# Patient Record
Sex: FEMALE | Race: WHITE
[De-identification: names, ages, dates, MRNs, and addresses within clinical notes are randomized per-mention and may not be internally consistent; named-entity substitution may affect disease eponyms.]

---

## 2017-06-01 ENCOUNTER — HOSPITAL ENCOUNTER (EMERGENCY)
Dept: HOSPITAL 76 - ED | Age: 42
LOS: 1 days | Discharge: HOME | End: 2017-06-02
Payer: MEDICAID

## 2017-06-01 VITALS — DIASTOLIC BLOOD PRESSURE: 86 MMHG | SYSTOLIC BLOOD PRESSURE: 135 MMHG

## 2017-06-01 DIAGNOSIS — J45.909: ICD-10-CM

## 2017-06-01 DIAGNOSIS — G80.9: ICD-10-CM

## 2017-06-01 DIAGNOSIS — F43.10: ICD-10-CM

## 2017-06-01 DIAGNOSIS — F32.9: Primary | ICD-10-CM

## 2017-06-01 DIAGNOSIS — R45.851: ICD-10-CM

## 2017-06-01 DIAGNOSIS — F41.9: ICD-10-CM

## 2017-06-01 LAB
ALBUMIN/GLOB SERPL: 1.5 {RATIO} (ref 1–2.2)
ANION GAP SERPL CALCULATED.4IONS-SCNC: 11 MMOL/L (ref 6–13)
APAP SERPL-MCNC: < 10 UG/ML (ref 10–30)
BASOPHILS NFR BLD AUTO: 0.1 10^3/UL (ref 0–0.1)
BASOPHILS NFR BLD AUTO: 0.8 %
BILIRUB BLD-MCNC: < 0.2 MG/DL (ref 0.2–1)
BUN SERPL-MCNC: 12 MG/DL (ref 6–20)
CALCIUM UR-MCNC: 9.7 MG/DL (ref 8.5–10.3)
CHLORIDE SERPL-SCNC: 105 MMOL/L (ref 101–111)
CO2 SERPL-SCNC: 22 MMOL/L (ref 21–32)
CREAT SERPLBLD-SCNC: 0.6 MG/DL (ref 0.4–1)
CUL URINE ADD CHARGE: (no result)
EOSINOPHIL # BLD AUTO: 0 10^3/UL (ref 0–0.7)
EOSINOPHIL NFR BLD AUTO: 0.5 %
ERYTHROCYTE [DISTWIDTH] IN BLOOD BY AUTOMATED COUNT: 12.4 % (ref 12–15)
GFRSERPLBLD MDRD-ARVRAT: 111 ML/MIN/{1.73_M2} (ref 89–?)
GLOBULIN SER-MCNC: 3.1 G/DL (ref 2.1–4.2)
GLUCOSE SERPL-MCNC: 99 MG/DL (ref 70–100)
HCT VFR BLD AUTO: 40.1 % (ref 37–47)
HGB UR QL STRIP: 13.7 G/DL (ref 12–16)
LIPASE SERPL-CCNC: 19 U/L (ref 22–51)
LYMPHOCYTES # SPEC AUTO: 2.5 10^3/UL (ref 1.5–3.5)
LYMPHOCYTES NFR BLD AUTO: 30.5 %
MCH RBC QN AUTO: 32.5 PG (ref 27–31)
MCHC RBC AUTO-ENTMCNC: 34.2 G/DL (ref 32–36)
MCV RBC AUTO: 94.9 FL (ref 81–99)
MONOCYTES # BLD AUTO: 0.8 10^3/UL (ref 0–1)
MONOCYTES NFR BLD AUTO: 10.3 %
NEUTROPHILS # BLD AUTO: 4.7 10^3/UL (ref 1.5–6.6)
NEUTROPHILS # SNV AUTO: 8.2 X10^3/UL (ref 4.8–10.8)
NEUTROPHILS NFR BLD AUTO: 57.9 %
NRBC # BLD AUTO: 0 /100WBC
PDW BLD AUTO: 9.2 FL (ref 7.9–10.8)
PH UR STRIP.AUTO: 6 PH (ref 5–7.5)
POTASSIUM SERPL-SCNC: 3.8 MMOL/L (ref 3.5–5)
PROT SPEC-MCNC: 7.8 G/DL (ref 6.7–8.2)
RBC MAR: 4.22 10^6/UL (ref 4.2–5.4)
SALICYLATES SERPL-MCNC: < 6 MG/DL
SODIUM SERPLBLD-SCNC: 138 MMOL/L (ref 135–145)
SP GR UR STRIP.AUTO: 1.01 (ref 1–1.03)
UA CHARGE (STRIP ONLY): (no result)
UA W/ MICROSCOPIC CHARGE: YES
UR CULTURE IF IND: (no result)
UROBILINOGEN UR STRIP.AUTO-MCNC: NEGATIVE MG/DL
WBC # BLD: 8.2 X10^3/UL
WBC # UR MANUAL: (no result) /HPF (ref 0–5)

## 2017-06-01 PROCEDURE — 80306 DRUG TEST PRSMV INSTRMNT: CPT

## 2017-06-01 PROCEDURE — 84703 CHORIONIC GONADOTROPIN ASSAY: CPT

## 2017-06-01 PROCEDURE — 81001 URINALYSIS AUTO W/SCOPE: CPT

## 2017-06-01 PROCEDURE — 87086 URINE CULTURE/COLONY COUNT: CPT

## 2017-06-01 PROCEDURE — 80320 DRUG SCREEN QUANTALCOHOLS: CPT

## 2017-06-01 PROCEDURE — 80307 DRUG TEST PRSMV CHEM ANLYZR: CPT

## 2017-06-01 PROCEDURE — 85025 COMPLETE CBC W/AUTO DIFF WBC: CPT

## 2017-06-01 PROCEDURE — 83690 ASSAY OF LIPASE: CPT

## 2017-06-01 PROCEDURE — 81025 URINE PREGNANCY TEST: CPT

## 2017-06-01 PROCEDURE — 99283 EMERGENCY DEPT VISIT LOW MDM: CPT

## 2017-06-01 PROCEDURE — 81003 URINALYSIS AUTO W/O SCOPE: CPT

## 2017-06-01 PROCEDURE — 80329 ANALGESICS NON-OPIOID 1 OR 2: CPT

## 2017-06-01 PROCEDURE — 80053 COMPREHEN METABOLIC PANEL: CPT

## 2017-06-01 PROCEDURE — 84443 ASSAY THYROID STIM HORMONE: CPT

## 2017-06-01 PROCEDURE — 36415 COLL VENOUS BLD VENIPUNCTURE: CPT

## 2017-06-01 NOTE — ED PHYSICIAN DOCUMENTATION
PD HPI MHE





- Stated complaint


Stated Complaint: MHE





- Chief complaint


Chief Complaint: MHE





- History obtained from


History obtained from: Patient





- History of Present Illness


Primary symptom: Suicidal ideation, Depression, Anxiety


Timing - onset: Today


Contributing factors: Sig other, Off meds


Similar symptoms before: Work up / diagnostics, Treatment


Recently seen: Not recently seen





- Additional information


Additional information: 





Patient is a 41 year old female with a history of anxiety and depression who is 

presenting to the emergency department for an anxiety attack and suicidal 

ideation.  Patient states that she has not been taking her medications (taking 

half doses).  Patient states that she will get these periods where she will get 

really upset and worried.  during the spells she feels out of control and has 

thought about suicide.  Upon initial evaluation in the emergency department 

patient stated that she was feeling a bit better and thought that maybe it had 

to do with the fact that her  was out of town.  





Review of Systems


Constitutional: denies: Fever, Chills


Eyes: denies: Photophobia


Ears: denies: Ear pain


Nose: denies: Rhinorrhea / runny nose, Congestion


Throat: denies: Oral lesions / sores, Sore throat


Cardiac: denies: Chest pain / pressure, Palpitations


Respiratory: denies: Cough


GI: denies: Abdominal Pain, Nausea, Vomiting


: denies: Dysuria, Frequency, Hesitancy


Skin: denies: Rash, Lesions


Musculoskeletal: denies: Neck pain, Back pain, Extremity pain, Joint pain


Psychiatric: reports: Depressed, Suicidal, Anxiety, Insomnia.  denies: Homicidal

, Hallucinations, Delusions


Immunocompromised: denies: Immunocompromised





PD PAST MEDICAL HISTORY





- Past Medical History


Respiratory: Asthma


Neuro: Cerebral palsy


Psych: Anxiety, Post traumatic stress disorder





- Past Surgical History


Past Surgical History: Yes


HEENT: Myringotomy (tubes)





- Present Medications


Home Medications: 


 Ambulatory Orders











 Medication  Instructions  Recorded  Confirmed


 


buPROPion [Wellbutrin Xl] 200 mg PO DAILY 11/21/15 06/01/17


 


LORazepam [Ativan] 0.25 mg PO Q6H #5 tablet 06/01/17 














- Allergies


Allergies/Adverse Reactions: 


 Allergies











Allergy/AdvReac Type Severity Reaction Status Date / Time


 


morphine Allergy  Rash Verified 04/15/16 15:06


 


povidone-iodine Allergy  Rash Verified 04/15/16 15:06





[From Betadine]     


 


soap [From Betadine] Allergy  Rash Verified 04/15/16 15:06














- Social History


Does the pt smoke?: No


Smoking Status: Never smoker


Does the pt drink ETOH?: No


Does the pt have substance abuse?: No





- Immunizations


Immunizations are current?: Yes





- POLST


Patient has POLST: No





PD ED PE NORMAL





- Vitals


Vital signs reviewed: Yes





- General


General: Alert and oriented X 3, Well developed/nourished





- HEENT


HEENT: Atraumatic, PERRL





- Cardiac


Cardiac: RRR, No murmur





- Respiratory


Respiratory: No respiratory distress, Clear bilaterally





- Abdomen


Abdomen: Soft, Non tender, Non distended





- Derm


Derm: Normal color, Warm and dry, No rash





- Extremities


Extremities: No deformity, No tenderness to palpate, No edema





- Neuro


Neuro: No motor deficit, No sensory deficit, Normal speech





PD ED PE EXPANDED





- Psych


Psych: Depressed, Tearful, Poor eye contact, Anxious





Results





- Vitals


Vitals: 





 Vital Signs - 24 hr











  06/01/17 06/01/17





  20:43 21:44


 


Temperature 36.8 C 


 


Heart Rate 124 H 94


 


Respiratory 16 18





Rate  


 


Blood Pressure 161/91 H 139/61 H


 


O2 Saturation 100 99








 Oxygen











O2 Source                      Room air

















- Labs


Labs: 





 Laboratory Tests











  06/01/17 06/01/17 06/01/17





  21:06 21:06 21:08


 


WBC    8.2


 


RBC    4.22


 


Hgb    13.7


 


Hct    40.1


 


MCV    94.9


 


MCH    32.5 H


 


MCHC    34.2


 


RDW    12.4


 


Plt Count    255


 


MPV    9.2


 


Neut #    4.7


 


Lymph #    2.5


 


Mono #    0.8


 


Eos #    0.0


 


Baso #    0.1


 


Absolute Nucleated RBC    0.00


 


Nucleated RBCs    0.0


 


Sodium   


 


Potassium   


 


Chloride   


 


Carbon Dioxide   


 


Anion Gap   


 


BUN   


 


Creatinine   


 


Estimated GFR (MDRD)   


 


Glucose   


 


Calcium   


 


Total Bilirubin   


 


AST   


 


ALT   


 


Alkaline Phosphatase   


 


Total Protein   


 


Albumin   


 


Globulin   


 


Albumin/Globulin Ratio   


 


Lipase   


 


TSH   


 


Serum HCG, Qual   


 


Urine Color   YELLOW 


 


Urine Clarity   CLEAR 


 


Urine pH   6.0 


 


Ur Specific Gravity   1.010 


 


Urine Protein   NEGATIVE 


 


Urine Glucose (UA)   NEGATIVE 


 


Urine Ketones   NEGATIVE 


 


Urine Occult Blood   NEGATIVE 


 


Urine Nitrite   NEGATIVE 


 


Urine Bilirubin   NEGATIVE 


 


Urine Urobilinogen   0.2 (NORMAL) 


 


Ur Leukocyte Esterase   SMALL H 


 


Urine RBC   0-5 


 


Urine WBC   4-5 


 


Ur Squamous Epith Cells   MANY Squamous H 


 


Urine Bacteria   Moderate H 


 


Ur Microscopic Review   INDICATED 


 


Urine Culture Comments   NOT INDICATED 


 


Urine HCG, Qual   Cancelled 


 


Salicylates   


 


Urine Opiates Screen  NEGATIVE  


 


Ur Oxycodone Screen  NEGATIVE  


 


Urine Methadone Screen  NEGATIVE  


 


Ur Propoxyphene Screen  NEGATIVE  


 


Acetaminophen   


 


Ur Barbiturates Screen  NEGATIVE  


 


Ur Tricyclics Screen  NEGATIVE  


 


Ur Phencyclidine Scrn  NEGATIVE  


 


Ur Amphetamine Screen  NEGATIVE  


 


U Methamphetamines Scrn  NEGATIVE  


 


U Benzodiazepines Scrn  NEGATIVE  


 


Urine Cocaine Screen  NEGATIVE  


 


U Cannabinoids Screen  NEGATIVE  


 


Ethyl Alcohol   














  06/01/17 06/01/17 06/01/17





  21:08 21:08 21:08


 


WBC   


 


RBC   


 


Hgb   


 


Hct   


 


MCV   


 


MCH   


 


MCHC   


 


RDW   


 


Plt Count   


 


MPV   


 


Neut #   


 


Lymph #   


 


Mono #   


 


Eos #   


 


Baso #   


 


Absolute Nucleated RBC   


 


Nucleated RBCs   


 


Sodium  138  


 


Potassium  3.8  


 


Chloride  105  


 


Carbon Dioxide  22  


 


Anion Gap  11.0  


 


BUN  12  


 


Creatinine  0.6  


 


Estimated GFR (MDRD)  111  


 


Glucose  99  


 


Calcium  9.7  


 


Total Bilirubin  < 0.2 L  


 


AST  15  


 


ALT  15  


 


Alkaline Phosphatase  36 L  


 


Total Protein  7.8  


 


Albumin  4.7  


 


Globulin  3.1  


 


Albumin/Globulin Ratio  1.5  


 


Lipase  19 L  


 


TSH   1.98 


 


Serum HCG, Qual    NEGATIVE


 


Urine Color   


 


Urine Clarity   


 


Urine pH   


 


Ur Specific Gravity   


 


Urine Protein   


 


Urine Glucose (UA)   


 


Urine Ketones   


 


Urine Occult Blood   


 


Urine Nitrite   


 


Urine Bilirubin   


 


Urine Urobilinogen   


 


Ur Leukocyte Esterase   


 


Urine RBC   


 


Urine WBC   


 


Ur Squamous Epith Cells   


 


Urine Bacteria   


 


Ur Microscopic Review   


 


Urine Culture Comments   


 


Urine HCG, Qual   


 


Salicylates  < 6.0  


 


Urine Opiates Screen   


 


Ur Oxycodone Screen   


 


Urine Methadone Screen   


 


Ur Propoxyphene Screen   


 


Acetaminophen  < 10 L  


 


Ur Barbiturates Screen   


 


Ur Tricyclics Screen   


 


Ur Phencyclidine Scrn   


 


Ur Amphetamine Screen   


 


U Methamphetamines Scrn   


 


U Benzodiazepines Scrn   


 


Urine Cocaine Screen   


 


U Cannabinoids Screen   


 


Ethyl Alcohol  < 5.0  














PD MEDICAL DECISION MAKING





- ED course


Complexity details: reviewed old records, reviewed results, re-evaluated patient

, considered differential, d/w patient, d/w consultant


ED course: 





Patient was seen and examined at bedside.  labs were drawn and urine was 

collected.  Patient stated that she did not need anything for anxiety at that 

time.  when patient's labs came back patient was medically clear.  [facility] was 

contacted and was able to talk with the patient.  patient was able to contract 

for safety and an urgent appointment was made for tomorrow.  patient had a good 

network at home and felt comfortable with the plan.  patient required no 

further work up and was stable for discharge with outpatient follow up.  





Departure





- Departure


Disposition: 01 Home, Self Care


Clinical Impression: 


 Anxiety, Depression


Condition: Good


Instructions:  ED Stress React


Follow-Up: 


Katty Velazquez ARNP [Primary Care Provider] - Tomorrow


Prescriptions: 


LORazepam [Ativan] 0.25 mg PO Q6H #5 tablet


Comments: 


It is important that you call the number given to you for the emergent 

appointment for tomorrow.  You should also call your pcp and therapist to 

schedule a follow up appointment for medication dosage.  You should return to 

the emergency department or call the crisis line for any thoughts of hurting 

yourself, any thoughts of hurting anyone else.  Or any hallucinations.

## 2017-07-20 ENCOUNTER — HOSPITAL ENCOUNTER (EMERGENCY)
Dept: HOSPITAL 76 - ED | Age: 42
Discharge: HOME | End: 2017-07-20
Payer: COMMERCIAL

## 2017-07-20 VITALS — DIASTOLIC BLOOD PRESSURE: 86 MMHG | SYSTOLIC BLOOD PRESSURE: 137 MMHG
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
## 2017-07-20 DIAGNOSIS — G80.9: ICD-10-CM

## 2017-07-20 DIAGNOSIS — S09.90XA: ICD-10-CM
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
## 2017-07-20 DIAGNOSIS — J45.909: ICD-10-CM

## 2017-07-20 DIAGNOSIS — S39.012A: Primary | ICD-10-CM

## 2017-07-20 PROCEDURE — 99283 EMERGENCY DEPT VISIT LOW MDM: CPT

## 2017-07-20 PROCEDURE — 70450 CT HEAD/BRAIN W/O DYE: CPT
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
## 2017-07-20 NOTE — CT REPORT
EXAM:

CT HEAD

 

EXAM DATE: 7/20/2017 01:47 PM.

 

CLINICAL HISTORY: Cerebral palsy. Persistent headaches following a head injury.

 

COMPARISON: None.

 

TECHNIQUE: Multiaxial CT images were obtained from the foramen magnum to the vertex. IV contrast: Non
e. Reformats: Coronal.

 

In accordance with CT protocol optimization, one or more of the following dose reduction techniques w
ere utilized for this exam: automated exposure control, adjustment of mA and/or KV based on patient s
ize, or use of iterative reconstructive technique.

 

FINDINGS:

Parenchyma: No intraparenchymal hemorrhage. No evidence of mass, midline shift, or CT findings of inf
arction. Gray-white differentiation is distinct.

 

Extraaxial Spaces: Normal for age. No subdural or epidural collections identified.

 

Ventricles: Normal in size and position.

 

Sinuses: Imaged paranasal sinuses, orbits, and mastoids show no significant abnormality.

 

Bones: No evidence of fracture or calvarial defect.

 

Other: None.

 

IMPRESSION: Normal head CT.

 

[facility] 

Referring Provider Line: [phone]

 

SITE ID: [id number]

## 2017-07-20 NOTE — CT PRELIMINARY REPORT
Accession: [id number]

Exam: CT Head W/O

 

IMPRESSION: Normal head CT.

 

[facility] 

 

SITE ID: [id number]

## 2017-08-06 ENCOUNTER — HOSPITAL ENCOUNTER (EMERGENCY)
Dept: HOSPITAL 76 - ED | Age: 42
Discharge: HOME | End: 2017-08-06
Payer: COMMERCIAL

## 2017-08-06 VITALS — DIASTOLIC BLOOD PRESSURE: 81 MMHG | SYSTOLIC BLOOD PRESSURE: 138 MMHG

## 2017-08-06 DIAGNOSIS — R00.0: ICD-10-CM

## 2017-08-06 DIAGNOSIS — T43.291A: Primary | ICD-10-CM

## 2017-08-06 DIAGNOSIS — R03.0: ICD-10-CM

## 2017-08-06 LAB
ANION GAP SERPL CALCULATED.4IONS-SCNC: 8 MMOL/L (ref 6–13)
BASE EXCESS BLDV CALC-SCNC: 2 MMOL/L
BUN SERPL-MCNC: 7 MG/DL (ref 6–20)
CALCIUM UR-MCNC: 9.2 MG/DL (ref 8.5–10.3)
CHLORIDE SERPL-SCNC: 104 MMOL/L (ref 101–111)
CO2 BLDV-SCNC: 28.3 MMOL/L (ref 24–29)
CO2 SERPL-SCNC: 25 MMOL/L (ref 21–32)
CREAT SERPLBLD-SCNC: 0.7 MG/DL (ref 0.4–1)
GFRSERPLBLD MDRD-ARVRAT: 93 ML/MIN/{1.73_M2} (ref 89–?)
GLUCOSE SERPL-MCNC: 91 MG/DL (ref 70–100)
PH BLDV: 7.41 [PH] (ref 7.31–7.41)
POTASSIUM SERPL-SCNC: 3.7 MMOL/L (ref 3.5–5)
SAO2 DF BLDV: 91.1 % (ref 60–80)
SODIUM SERPLBLD-SCNC: 137 MMOL/L (ref 135–145)

## 2017-08-06 PROCEDURE — 99283 EMERGENCY DEPT VISIT LOW MDM: CPT

## 2017-08-06 PROCEDURE — 96361 HYDRATE IV INFUSION ADD-ON: CPT

## 2017-08-06 PROCEDURE — 96374 THER/PROPH/DIAG INJ IV PUSH: CPT

## 2017-08-06 PROCEDURE — 93005 ELECTROCARDIOGRAM TRACING: CPT

## 2017-08-06 PROCEDURE — 36415 COLL VENOUS BLD VENIPUNCTURE: CPT

## 2017-08-06 PROCEDURE — 80048 BASIC METABOLIC PNL TOTAL CA: CPT

## 2017-08-06 PROCEDURE — 82803 BLOOD GASES ANY COMBINATION: CPT

## 2017-08-06 PROCEDURE — 99284 EMERGENCY DEPT VISIT MOD MDM: CPT

## 2017-08-06 NOTE — ED PHYSICIAN DOCUMENTATION
History of Present Illness





- Stated complaint


Stated Complaint: MOUTH NUMBNESS/ARM TINGGLE





- Chief complaint


Chief Complaint: General





- History obtained from


History obtained from: Patient





- History of Present Illness


Timing: Other (She is undergoing some medication changes and inadvertently took 

an extra dose of Wellbutrin, total of 400 instead of 200 mg this morning at 9 

AM.  Around noon today she started to feel very anxious, her lips and arms were 

tingling and felt heavy.  It is painless.)





Review of Systems


Ten Systems: 10 systems reviewed and negative


Constitutional: denies: Fever, Chills


Cardiac: denies: Chest pain / pressure, Palpitations


Respiratory: denies: Dyspnea, Cough


GI: denies: Abdominal Pain





PD PAST MEDICAL HISTORY





- Past Medical History


Past Medical History: Yes


Respiratory: Asthma


Neuro: Cerebral palsy


Psych: Anxiety, Post traumatic stress disorder


Other Past Medical History: CP





- Past Surgical History


Past Surgical History: Yes


HEENT: Myringotomy (tubes)





- Present Medications


Home Medications: 


 Ambulatory Orders











 Medication  Instructions  Recorded  Confirmed


 


buPROPion [Wellbutrin Xl] 200 mg PO DAILY 11/21/15 08/06/17


 


LORazepam [Ativan] 0.25 mg PO Q6H #5 tablet 06/01/17 08/06/17


 


Cyclobenzaprine [Flexeril] 10 mg PO TID PRN #20 tablet 07/20/17 08/06/17


 


Lamotrigine [Lamotrigine ER] 50 mg PO BID 07/20/17 08/06/17


 


Lidocaine Patch 5% [Lidoderm Patch] 1 each TOP DAILY PRN #10 patch 07/20/17 08/ 06/17














- Allergies


Allergies/Adverse Reactions: 


 Allergies











Allergy/AdvReac Type Severity Reaction Status Date / Time


 


morphine Allergy  Rash Verified 04/15/16 15:06


 


povidone-iodine Allergy  Rash Verified 04/15/16 15:06





[From Betadine]     


 


soap [From Betadine] Allergy  Rash Verified 04/15/16 15:06














- Social History


Does the pt smoke?: No


Smoking Status: Never smoker


Does the pt drink ETOH?: No


Does the pt have substance abuse?: No





- Immunizations


Immunizations are current?: Yes





- POLST


Patient has POLST: No





PD ED PE NORMAL





- Vitals


Vital signs reviewed: Yes





- General


General: Alert and oriented X 3, Other (Anxious, speaking quickly)





- HEENT


HEENT: PERRL, EOMI, Other (Midpoint reactive pupils)





- Neck


Neck: Supple, no meningeal sign, No bony TTP





- Cardiac


Cardiac: Other (Tachycardic, regular, no murmur)





- Respiratory


Respiratory: No respiratory distress, Clear bilaterally





- Abdomen


Abdomen: Non tender





- Extremities


Extremities: No edema, No calf tenderness / cord





- Neuro


Neuro: Alert and oriented X 3, Normal speech





Results





- Vitals


Vitals: 


 Vital Signs - 24 hr











  08/06/17 08/06/17





  12:49 13:22


 


Temperature 36.4 C L 


 


Heart Rate 127 H 93


 


Respiratory 22 18





Rate  


 


Blood Pressure 161/111 H 141/94 H


 


O2 Saturation 100 100








 Oxygen











O2 Source                      Room air

















- EKG (time done)


  ** 1306


Rate: Rate (enter#) (112)


Rhythm: Sinus tachycardia


Axis: Normal


Intervals: Normal OH


QRS: Normal


Ischemia: Normal ST segments


Computer interpretation: Agree with computer





- Labs


Labs: 


 Laboratory Tests











  08/06/17 08/06/17





  13:15 13:15


 


VBG pH   7.414 H


 


VBG pCO2   43.1


 


VBG pO2   55.3 H


 


VBG HCO3   27.0


 


VBG Total CO2   28.3


 


VBG O2 Saturation   91.1 H


 


VBG Base Excess   2.0


 


Sodium  137 


 


Potassium  3.7 


 


Chloride  104 


 


Carbon Dioxide  25 


 


Anion Gap  8.0 


 


BUN  7 


 


Creatinine  0.7 


 


Estimated GFR (MDRD)  93 


 


Glucose  91 


 


Calcium  9.2 














PD MEDICAL DECISION MAKING





- ED course


ED course: 





She accidentally took a double dose of her usual medications.  She presents 

also with symptoms and signs of a panic attack.  Feeling better after IV fluids 

and Ativan.  Observe for a couple of hours without clinical change.





Departure





- Departure


Disposition: 01 Home, Self Care


Clinical Impression: 


Accidental overdose


Qualifiers:


 Encounter type: initial encounter Qualified Code(s): T50.901A - Poisoning by 

unspecified drugs, medicaments and biological substances, accidental (

unintentional), initial encounter


Condition: Good


Record reviewed to determine appropriate education?: Yes


Instructions:  ED Overdose Accidental


Comments: 


Your blood pressure was elevated today on check into the emergency department.  

This does not mean that you have hypertension, it is a common phenomenon to 

come to the emergency department and have elevated blood pressure.  I recommend 

that she see her primary care physician within the week to have it rechecked 

when you are feeling better.





Call your doctor to arrange a follow-up appointment, make the next available 

appointment.  In the interim, return anytime if worse or if new symptoms 

develop.

## 2017-10-12 ENCOUNTER — HOSPITAL ENCOUNTER (OUTPATIENT)
Dept: HOSPITAL 76 - LAB | Age: 42
Discharge: HOME | End: 2017-10-12
Attending: NURSE PRACTITIONER
Payer: MEDICAID

## 2017-10-12 DIAGNOSIS — Z13.1: Primary | ICD-10-CM

## 2017-10-12 LAB
EST. AVERAGE GLUCOSE BLD GHB EST-MCNC: 105 MG/DL (ref 70–100)
HBA1C BLD-MCNC: 0.52 G/DL

## 2017-10-12 PROCEDURE — 83036 HEMOGLOBIN GLYCOSYLATED A1C: CPT

## 2017-10-12 PROCEDURE — 36415 COLL VENOUS BLD VENIPUNCTURE: CPT

## 2017-10-21 ENCOUNTER — HOSPITAL ENCOUNTER (EMERGENCY)
Dept: HOSPITAL 76 - ED | Age: 42
Discharge: HOME | End: 2017-10-21
Payer: MEDICAID

## 2017-10-21 VITALS — SYSTOLIC BLOOD PRESSURE: 135 MMHG | DIASTOLIC BLOOD PRESSURE: 95 MMHG

## 2017-10-21 DIAGNOSIS — N76.0: ICD-10-CM

## 2017-10-21 DIAGNOSIS — G62.9: Primary | ICD-10-CM

## 2017-10-21 DIAGNOSIS — G80.9: ICD-10-CM

## 2017-10-21 LAB
CUL URINE ADD CHARGE: (no result)
PH UR STRIP.AUTO: 7 PH (ref 5–7.5)
SP GR UR STRIP.AUTO: 1.02 (ref 1–1.03)
UA CHARGE (STRIP ONLY): YES
UA W/ MICROSCOPIC CHARGE: (no result)
UR CULTURE IF IND: (no result)
UROBILINOGEN UR STRIP.AUTO-MCNC: NEGATIVE MG/DL

## 2017-10-21 PROCEDURE — 81001 URINALYSIS AUTO W/SCOPE: CPT

## 2017-10-21 PROCEDURE — 99283 EMERGENCY DEPT VISIT LOW MDM: CPT

## 2017-10-21 PROCEDURE — 81003 URINALYSIS AUTO W/O SCOPE: CPT

## 2017-10-21 PROCEDURE — 87086 URINE CULTURE/COLONY COUNT: CPT

## 2017-10-21 PROCEDURE — 81025 URINE PREGNANCY TEST: CPT

## 2017-10-21 NOTE — ED PHYSICIAN DOCUMENTATION
History of Present Illness





- Stated complaint


Stated Complaint: RT HAND WEAKNESS,ANXIETY





- Chief complaint


Chief Complaint: Ext Problem





- History obtained from


History obtained from: Patient





- Additonal information


Additional information: 





Patient is a 42 year old female with a history of cerebral palsy who is 

presenting to the emergency department for tingling in her right hand, and a 

foul vaginal odor.  Patient states that for a lot of the day it seems like her 

arm has gone to sleep. Patient states that she has had this before in the past.

  Patient denied any lack of strength or motor dysfunction.  Patient also 

states that she has a foul odor coming from her vagina.  Patient states that 

she washes it but the smell comes back.  Patient denies any vaginal bleeding or 

vaginal discharge.   





Review of Systems


Constitutional: denies: Fever, Chills


Eyes: denies: Loss of vision, Photophobia, Discharge, Irritation


Ears: denies: Loss of hearing, Ear pain


Nose: reports: Reviewed and negative


Throat: reports: Reviewed and negative


Cardiac: denies: Chest pain / pressure, Palpitations


Respiratory: denies: Cough, Wheezing


GI: denies: Abdominal Pain, Nausea, Vomiting


: reports: Other (irregular small).  denies: Dysuria, Frequency, Hesitancy, 

Discharge, Vaginal bleeding


Skin: denies: Rash, Lesions


Musculoskeletal: denies: Extremity pain


Neurologic: reports: Numbness


Immunocompromised: denies: Immunocompromised





PD PAST MEDICAL HISTORY





- Past Medical History


Past Medical History: Yes


Respiratory: Asthma


Neuro: Cerebral palsy


Psych: Anxiety, Post traumatic stress disorder





- Past Surgical History


Past Surgical History: Yes


HEENT: Myringotomy (tubes)





- Present Medications


Home Medications: 


 Ambulatory Orders











 Medication  Instructions  Recorded  Confirmed


 


buPROPion [Wellbutrin Xl] 200 mg PO DAILY 11/21/15 10/21/17


 


LORazepam [Ativan] 0.25 mg PO Q6H #5 tablet 06/01/17 10/21/17


 


Lamotrigine [Lamotrigine ER] 50 mg PO BID 07/20/17 10/21/17


 


Fluconazole [Diflucan] 150 mg PO ONCE #1 tablet 10/21/17 


 


Metronidazole [Flagyl] 500 mg PO BID 7 Days  tablet 10/21/17 


 


traZODone [Desyrel] 50 mg PO QPM 10/21/17 10/21/17














- Allergies


Allergies/Adverse Reactions: 


 Allergies











Allergy/AdvReac Type Severity Reaction Status Date / Time


 


morphine Allergy  Rash Verified 10/21/17 19:15


 


povidone-iodine Allergy  Rash Verified 10/21/17 19:15





[From Betadine]     


 


soap [From Betadine] Allergy  Rash Verified 10/21/17 19:15














- Social History


Does the pt smoke?: No


Smoking Status: Never smoker


Does the pt drink ETOH?: No


Does the pt have substance abuse?: No





- Immunizations


Immunizations are current?: Yes





- POLST


Patient has POLST: No





PD ED PE NORMAL





- Vitals


Vital signs reviewed: Yes





- General


General: Alert and oriented X 3, No acute distress, Well developed/nourished





- HEENT


HEENT: Atraumatic, PERRL





- Neck


Neck: Supple, no meningeal sign





- Cardiac


Cardiac: RRR, No murmur





- Respiratory


Respiratory: No respiratory distress, Clear bilaterally





- Abdomen


Abdomen: Soft, Non tender, Non distended





- Female 


Female : Pt declined





- Derm


Derm: Normal color, Warm and dry, No rash





- Extremities


Extremities: No deformity, No tenderness to palpate, Normal ROM s pain, No edema

, No calf tenderness / cord





- Neuro


Neuro: Alert and oriented X 3, No motor deficit, No sensory deficit, Normal 

speech





PD ED PE EXPANDED





- General


General: Alert, Anxious





Results





- Vitals


Vitals: 


 Vital Signs - 24 hr











  10/21/17 10/21/17





  19:11 21:00


 


Temperature 36.7 C 37.1 C


 


Heart Rate 106 H 99


 


Respiratory 20 20





Rate  


 


Blood Pressure 151/94 H 135/95 H


 


O2 Saturation 100 








 Oxygen











O2 Source                      Room air

















- Labs


Labs: 


 Laboratory Tests











  10/21/17





  19:39


 


Urine Color  YELLOW


 


Urine Clarity  CLEAR


 


Urine pH  7.0


 


Ur Specific Gravity  1.020


 


Urine Protein  NEGATIVE


 


Urine Glucose (UA)  NEGATIVE


 


Urine Ketones  NEGATIVE


 


Urine Occult Blood  NEGATIVE


 


Urine Nitrite  NEGATIVE


 


Urine Bilirubin  NEGATIVE


 


Urine Urobilinogen  0.2 (NORMAL)


 


Ur Leukocyte Esterase  NEGATIVE


 


Ur Microscopic Review  NOT INDICATED


 


Urine Culture Comments  NOT INDICATED


 


Urine HCG, Qual  Cancelled














PD MEDICAL DECISION MAKING





- ED course


Complexity details: reviewed old records, reviewed results, re-evaluated patient

, considered differential, d/w patient


ED course: 





Patient was seen and examined at bedside.  patient was well appearing and in no 

acute distress.  Patient had no neurological deficits.  urine was collected and 

showed no sign of infection.  Patient's symptoms were likely secondary to bv.  

Patient was started on flagyl and was stable for discharge with outpatient 

follow up.  





Departure





- Departure


Disposition: 01 Home, Self Care


Clinical Impression: 


 Peripheral neuropathy, Bacterial vaginosis





Condition: Good


Instructions:  ED Vaginosis Bacterial


Follow-Up: 


Katty Velazquez ARNP [Primary Care Provider] - Within 3 Days


Prescriptions: 


Fluconazole [Diflucan] 150 mg PO ONCE #1 tablet


Metronidazole [Flagyl] 500 mg PO BID 7 Days  tablet


Comments: 


The numbness in your hand is likely being caused by peripheral neuropathy.  It 

is normally self limited and benign in nature.  Due to your family history of 

MS you should follow up with your doctor for further evaluation and care.  As 

far as the vaginal smell it is likely being caused by bacterial vaginosis.  you 

will need to take flagyl twice a day for the next week.  You cannot drink 

alcohol with it as it can give you a severe reaction.  You can take the 

diflucan if necessary if your develop a yeast infection secondary to the 

antibiotics.  You should drink plenty of fluids and follow up with your doctor 

in the next few days.  You can return to the emergency department at any time 

if necessary for new, worsening or uncontrollable symptoms.  


Discharge Date/Time: 10/21/17 21:00

## 2017-12-12 ENCOUNTER — HOSPITAL ENCOUNTER (OUTPATIENT)
Dept: HOSPITAL 76 - LAB.R | Age: 42
Discharge: HOME | End: 2017-12-12
Attending: OBSTETRICS & GYNECOLOGY
Payer: MEDICAID

## 2017-12-12 DIAGNOSIS — N89.8: ICD-10-CM

## 2017-12-12 DIAGNOSIS — R10.2: Primary | ICD-10-CM

## 2017-12-12 PROCEDURE — 87480 CANDIDA DNA DIR PROBE: CPT

## 2017-12-12 PROCEDURE — 87660 TRICHOMONAS VAGIN DIR PROBE: CPT

## 2017-12-12 PROCEDURE — 87510 GARDNER VAG DNA DIR PROBE: CPT

## 2017-12-12 PROCEDURE — 87491 CHLMYD TRACH DNA AMP PROBE: CPT

## 2017-12-12 PROCEDURE — 87591 N.GONORRHOEAE DNA AMP PROB: CPT

## 2018-02-28 ENCOUNTER — HOSPITAL ENCOUNTER (EMERGENCY)
Dept: HOSPITAL 76 - ED | Age: 43
Discharge: LEFT BEFORE DISCHARGE | End: 2018-02-28
Payer: MEDICAID

## 2018-02-28 VITALS — DIASTOLIC BLOOD PRESSURE: 72 MMHG | SYSTOLIC BLOOD PRESSURE: 138 MMHG

## 2018-02-28 DIAGNOSIS — F33.1: Primary | ICD-10-CM

## 2018-02-28 DIAGNOSIS — R45.851: ICD-10-CM

## 2018-02-28 DIAGNOSIS — G80.9: ICD-10-CM

## 2018-02-28 LAB
ALBUMIN DIAFP-MCNC: 4.5 G/DL (ref 3.2–5.5)
ALBUMIN/GLOB SERPL: 1.6 {RATIO} (ref 1–2.2)
ALP SERPL-CCNC: 23 IU/L (ref 42–121)
ALT SERPL W P-5'-P-CCNC: 18 IU/L (ref 10–60)
AMPHET UR QL SCN: NEGATIVE
ANION GAP SERPL CALCULATED.4IONS-SCNC: 16 MMOL/L (ref 6–13)
APAP SERPL-MCNC: < 10 UG/ML (ref 10–30)
AST SERPL W P-5'-P-CCNC: 19 IU/L (ref 10–42)
BASOPHILS NFR BLD AUTO: 0 10^3/UL (ref 0–0.1)
BASOPHILS NFR BLD AUTO: 0.4 %
BENZODIAZ UR QL SCN: POSITIVE
BILIRUB BLD-MCNC: 0.7 MG/DL (ref 0.2–1)
BILIRUB UR QL CFM: NEGATIVE
BUN SERPL-MCNC: 12 MG/DL (ref 6–20)
CALCIUM UR-MCNC: 9.5 MG/DL (ref 8.5–10.3)
CHLORIDE SERPL-SCNC: 101 MMOL/L (ref 101–111)
CLARITY UR REFRACT.AUTO: (no result)
CO2 SERPL-SCNC: 22 MMOL/L (ref 21–32)
COCAINE UR-SCNC: NEGATIVE UMOL/L
CREAT SERPLBLD-SCNC: 0.6 MG/DL (ref 0.4–1)
EOSINOPHIL # BLD AUTO: 0 10^3/UL (ref 0–0.7)
EOSINOPHIL NFR BLD AUTO: 0.5 %
ERYTHROCYTE [DISTWIDTH] IN BLOOD BY AUTOMATED COUNT: 12 % (ref 12–15)
GFRSERPLBLD MDRD-ARVRAT: 111 ML/MIN/{1.73_M2} (ref 89–?)
GLOBULIN SER-MCNC: 2.9 G/DL (ref 2.1–4.2)
GLUCOSE SERPL-MCNC: 102 MG/DL (ref 70–100)
GLUCOSE UR QL STRIP.AUTO: NEGATIVE MG/DL
HCG UR QL: NEGATIVE
HGB UR QL STRIP: 13.9 G/DL (ref 12–16)
KETONES UR QL STRIP.AUTO: >=80 MG/DL
LIPASE SERPL-CCNC: 29 U/L (ref 22–51)
LYMPHOCYTES # SPEC AUTO: 1.9 10^3/UL (ref 1.5–3.5)
LYMPHOCYTES NFR BLD AUTO: 29.4 %
MCH RBC QN AUTO: 32.4 PG (ref 27–31)
MCHC RBC AUTO-ENTMCNC: 34.3 G/DL (ref 32–36)
MCV RBC AUTO: 94.6 FL (ref 81–99)
METHADONE UR QL SCN: NEGATIVE
METHAMPHET UR QL SCN: NEGATIVE
MONOCYTES # BLD AUTO: 0.6 10^3/UL (ref 0–1)
MONOCYTES NFR BLD AUTO: 9.6 %
MUCOUS THREADS URNS QL MICRO: (no result)
NEUTROPHILS # BLD AUTO: 3.8 10^3/UL (ref 1.5–6.6)
NEUTROPHILS # SNV AUTO: 6.4 X10^3/UL (ref 4.8–10.8)
NEUTROPHILS NFR BLD AUTO: 60.1 %
NITRITE UR QL STRIP.AUTO: NEGATIVE
OPIATES UR QL SCN: NEGATIVE
PDW BLD AUTO: 8.9 FL (ref 7.9–10.8)
PH UR STRIP.AUTO: 6 PH (ref 5–7.5)
PLATELET # BLD: 248 10^3/UL (ref 130–450)
PROT SPEC-MCNC: 7.4 G/DL (ref 6.7–8.2)
PROT UR STRIP.AUTO-MCNC: (no result) MG/DL
RBC # UR STRIP.AUTO: (no result) /UL
RBC MAR: 4.3 10^6/UL (ref 4.2–5.4)
SALICYLATES SERPL-MCNC: < 6 MG/DL
SODIUM SERPLBLD-SCNC: 139 MMOL/L (ref 135–145)
SP GR UR STRIP.AUTO: >=1.03 (ref 1–1.03)
SP GR UR STRIP.AUTO: >=1.03 (ref 1–1.03)
SQUAMOUS URNS QL MICRO: (no result)
UROBILINOGEN UR QL STRIP.AUTO: (no result) E.U./DL
UROBILINOGEN UR STRIP.AUTO-MCNC: NEGATIVE MG/DL
VOLATILE DRUGS POS SERPL SCN: (no result)

## 2018-02-28 PROCEDURE — 99283 EMERGENCY DEPT VISIT LOW MDM: CPT

## 2018-02-28 PROCEDURE — 80329 ANALGESICS NON-OPIOID 1 OR 2: CPT

## 2018-02-28 PROCEDURE — 81025 URINE PREGNANCY TEST: CPT

## 2018-02-28 PROCEDURE — 80306 DRUG TEST PRSMV INSTRMNT: CPT

## 2018-02-28 PROCEDURE — 83690 ASSAY OF LIPASE: CPT

## 2018-02-28 PROCEDURE — 85025 COMPLETE CBC W/AUTO DIFF WBC: CPT

## 2018-02-28 PROCEDURE — 81003 URINALYSIS AUTO W/O SCOPE: CPT

## 2018-02-28 PROCEDURE — 36415 COLL VENOUS BLD VENIPUNCTURE: CPT

## 2018-02-28 PROCEDURE — 80053 COMPREHEN METABOLIC PANEL: CPT

## 2018-02-28 PROCEDURE — 87086 URINE CULTURE/COLONY COUNT: CPT

## 2018-02-28 PROCEDURE — 80307 DRUG TEST PRSMV CHEM ANLYZR: CPT

## 2018-02-28 PROCEDURE — 80320 DRUG SCREEN QUANTALCOHOLS: CPT

## 2018-02-28 PROCEDURE — 81001 URINALYSIS AUTO W/SCOPE: CPT

## 2018-02-28 NOTE — ED PHYSICIAN DOCUMENTATION
PD HPI MHE





- Stated complaint


Stated Complaint: SI





- Chief complaint


Chief Complaint: MHE





- History obtained from


History obtained from: Patient





- History of Present Illness


Primary symptom: Other (She has a history of cerebral palsy and depression.  

Last night she was thinking about hurting herself by overdosing on Ambien.  She 

has been hospitalized for similar issues before but the last time was in 

[place].  Denies alcohol or drug use except for marijuana edibles occasionally.)





Review of Systems


Constitutional: denies: Fever, Chills


Cardiac: reports: Reviewed and negative


Respiratory: reports: Reviewed and negative


GI: reports: Reviewed and negative





PD PAST MEDICAL HISTORY





- Past Medical History


Respiratory: Asthma


Neuro: Cerebral palsy


Psych: Anxiety, Post traumatic stress disorder





- Past Surgical History


Past Surgical History: Yes


HEENT: Myringotomy (tubes)





- Present Medications


Home Medications: 


 Ambulatory Orders











 Medication  Instructions  Recorded  Confirmed


 


buPROPion [Wellbutrin Xl] 200 mg PO DAILY 11/21/15 10/21/17


 


LORazepam [Ativan] 0.25 mg PO Q6H #5 tablet 06/01/17 10/21/17


 


Lamotrigine [Lamotrigine ER] 50 mg PO BID 07/20/17 10/21/17


 


Fluconazole [Diflucan] 150 mg PO ONCE #1 tablet 10/21/17 


 


Metronidazole [Flagyl] 500 mg PO BID 7 Days  tablet 10/21/17 


 


traZODone [Desyrel] 50 mg PO QPM 10/21/17 10/21/17














- Allergies


Allergies/Adverse Reactions: 


 Allergies











Allergy/AdvReac Type Severity Reaction Status Date / Time


 


morphine Allergy  Rash Verified 02/28/18 14:38


 


povidone-iodine Allergy  Rash Verified 02/28/18 14:38





[From Betadine]     


 


soap [From Betadine] Allergy  Rash Verified 02/28/18 14:38














- Social History


Does the pt smoke?: No


Smoking Status: Never smoker


Does the pt drink ETOH?: No


Does the pt have substance abuse?: No


Substance Use and Type: Marijuana





- Family History


Family history: reports: Non contributory





- Immunizations


Immunizations are current?: Yes





- POLST


Patient has POLST: No





PD ED PE NORMAL





- Vitals


Vital signs reviewed: Yes





- General


General: Alert and oriented X 3, Other (Poor eye contact, tearful)





- HEENT


HEENT: PERRL, EOMI





- Neck


Neck: Supple, no meningeal sign, No bony TTP





- Cardiac


Cardiac: RRR, No murmur





- Respiratory


Respiratory: No respiratory distress, Clear bilaterally





- Abdomen


Abdomen: Soft, Non tender





- Derm


Derm: Normal color, Warm and dry





- Extremities


Extremities: No edema, No calf tenderness / cord





- Neuro


Neuro: Alert and oriented X 3, Normal speech


Eye Opening: Spontaneous


Motor: Obeys Commands


Verbal: Oriented


GCS Score: 15





Results





- Vitals


Vitals: 


 Vital Signs - 24 hr











  02/28/18 02/28/18





  14:34 18:15


 


Temperature 37.0 C 36.8 C


 


Heart Rate 100 78


 


Respiratory 18 16





Rate  


 


Blood Pressure 147/89 H 138/72 H


 


O2 Saturation 99 100








 Oxygen











O2 Source                      Room air

















- Labs


Labs: 


 Laboratory Tests











  02/28/18 02/28/18 02/28/18





  14:45 14:45 15:00


 


WBC   


 


RBC   


 


Hgb   


 


Hct   


 


MCV   


 


MCH   


 


MCHC   


 


RDW   


 


Plt Count   


 


MPV   


 


Neut #   


 


Lymph #   


 


Mono #   


 


Eos #   


 


Baso #   


 


Absolute Nucleated RBC   


 


Nucleated RBC %   


 


Sodium    139


 


Potassium    3.2 L


 


Chloride    101


 


Carbon Dioxide    22


 


Anion Gap    16.0 H


 


BUN    12


 


Creatinine    0.6


 


Estimated GFR (MDRD)    111


 


Glucose    102 H


 


Calcium    9.5


 


Total Bilirubin    0.7


 


AST    19


 


ALT    18


 


Alkaline Phosphatase    23 L


 


Total Protein    7.4


 


Albumin    4.5


 


Globulin    2.9


 


Albumin/Globulin Ratio    1.6


 


Lipase    29


 


Urine Color  YELLOW  


 


Urine Clarity  SL. CLOUDY  


 


Urine pH  6.0  


 


Ur Specific Gravity  >=1.030 H  >=1.030 H 


 


Urine Protein  TRACE  


 


Urine Glucose (UA)  NEGATIVE  


 


Urine Ketones  >=80 H  


 


Urine Occult Blood  LARGE H  


 


Urine Nitrite  NEGATIVE  


 


Urine Bilirubin  NEGATIVE  


 


Urine Urobilinogen  1 (NORMAL)  


 


Ur Leukocyte Esterase  NEGATIVE  


 


Urine RBC  11-25 H  


 


Urine WBC  0-3  


 


Ur Squamous Epith Cells  MANY Squamous H  


 


Urine Bacteria  Few  


 


Urine Mucus  Marked Strands  


 


Ur Microscopic Review  INDICATED  


 


Urine Culture Comments  NOT INDICATED  


 


Urine HCG, Qual   NEGATIVE 


 


Salicylates    < 6.0


 


Urine Opiates Screen  NEGATIVE  


 


Ur Oxycodone Screen  NEGATIVE  


 


Urine Methadone Screen  NEGATIVE  


 


Ur Propoxyphene Screen  NEGATIVE  


 


Acetaminophen    < 10 L


 


Ur Barbiturates Screen  NEGATIVE  


 


Ur Tricyclics Screen  NEGATIVE  


 


Ur Phencyclidine Scrn  NEGATIVE  


 


Ur Amphetamine Screen  NEGATIVE  


 


U Methamphetamines Scrn  NEGATIVE  


 


U Benzodiazepines Scrn  POSITIVE H  


 


Urine Cocaine Screen  NEGATIVE  


 


U Cannabinoids Screen  NEGATIVE  


 


Ethyl Alcohol    < 5.0














  02/28/18





  15:00


 


WBC  6.4


 


RBC  4.30


 


Hgb  13.9


 


Hct  40.6


 


MCV  94.6


 


MCH  32.4 H


 


MCHC  34.3


 


RDW  12.0


 


Plt Count  248


 


MPV  8.9


 


Neut #  3.8


 


Lymph #  1.9


 


Mono #  0.6


 


Eos #  0.0


 


Baso #  0.0


 


Absolute Nucleated RBC  0.01


 


Nucleated RBC %  0.1


 


Sodium 


 


Potassium 


 


Chloride 


 


Carbon Dioxide 


 


Anion Gap 


 


BUN 


 


Creatinine 


 


Estimated GFR (MDRD) 


 


Glucose 


 


Calcium 


 


Total Bilirubin 


 


AST 


 


ALT 


 


Alkaline Phosphatase 


 


Total Protein 


 


Albumin 


 


Globulin 


 


Albumin/Globulin Ratio 


 


Lipase 


 


Urine Color 


 


Urine Clarity 


 


Urine pH 


 


Ur Specific Gravity 


 


Urine Protein 


 


Urine Glucose (UA) 


 


Urine Ketones 


 


Urine Occult Blood 


 


Urine Nitrite 


 


Urine Bilirubin 


 


Urine Urobilinogen 


 


Ur Leukocyte Esterase 


 


Urine RBC 


 


Urine WBC 


 


Ur Squamous Epith Cells 


 


Urine Bacteria 


 


Urine Mucus 


 


Ur Microscopic Review 


 


Urine Culture Comments 


 


Urine HCG, Qual 


 


Salicylates 


 


Urine Opiates Screen 


 


Ur Oxycodone Screen 


 


Urine Methadone Screen 


 


Ur Propoxyphene Screen 


 


Acetaminophen 


 


Ur Barbiturates Screen 


 


Ur Tricyclics Screen 


 


Ur Phencyclidine Scrn 


 


Ur Amphetamine Screen 


 


U Methamphetamines Scrn 


 


U Benzodiazepines Scrn 


 


Urine Cocaine Screen 


 


U Cannabinoids Screen 


 


Ethyl Alcohol 














PD MEDICAL DECISION MAKING





- ED course


ED course: 





She is a voluntary psychiatric patient who had thoughts of suicide by overdose 

last night.  We were waiting for social work evaluation and she requested to 

leave.  I made it clear that she was welcome to stay for evaluation but she 

refused.  She was not actively suicidal and plans to return tomorrow.





Departure





- Departure


Disposition: 07 Against Medical Advice


Clinical Impression: 


Depression


Qualifiers:


 Depression Type: major depressive disorder Major depression recurrence: 

recurrent Active/Remission status: currently active Major depression episode 

severity: moderate Qualified Code(s): F33.1 - Major depressive disorder, 

recurrent, moderate





Condition: Stable


Discharge Date/Time: 02/28/18 18:26